# Patient Record
Sex: MALE
[De-identification: names, ages, dates, MRNs, and addresses within clinical notes are randomized per-mention and may not be internally consistent; named-entity substitution may affect disease eponyms.]

---

## 2017-10-28 NOTE — EDM.PDOC
ED HPI GENERAL MEDICAL PROBLEM





- General


Chief Complaint: Chest Pain


Stated Complaint: CHEST AND ARM PAIN


Time Seen by Provider: 10/28/17 13:55





- History of Present Illness


INITIAL COMMENTS - FREE TEXT/NARRATIVE: 





67-year-old male presents emergency room with chest pain.





This pain has been getting pretty bad over the last hour or so. However he's 

had significant pain for the last week that comes and goes. He cannot correlate 

of activity makes this worse he has had the pain come on with rest however it 

was to a lesser degree than the with the pain came on today. The patient smokes 

1-1/2-2 packs a day he's been told he's had high cholesterol in the past but 

these conscientious about his diet. Family history is negative for coronary 

artery disease his dad  of infection mom  of stroke and he had a sister 

 of what sounds like a berry aneurysm in her head. At times the pain seems 

burning but at times it is strongly pressure and this is when it radiates into 

both arms. The patient has had intermittent arm pain and hand pain for many 

years attributed to arthritis but this new pain for the last week is much 

different than and mostly affects the upper arm seems to be a pressure ache 

sensation.


  ** Chest


Pain Score (Numeric/FACES): 9





- Related Data


 Allergies











Allergy/AdvReac Type Severity Reaction Status Date / Time


 


No Known Allergies Allergy   Verified 10/28/17 14:08











Home Meds: 


 Home Meds





. [No Known Home Meds]  10/28/17 [History]











ED ROS GENERAL





- Review of Systems


Review Of Systems: See Below


Constitutional: Reports: No Symptoms


HEENT: Reports: No Symptoms


Respiratory: Reports: No Symptoms


Cardiovascular: Reports: Chest Pain.  Denies: Edema, Palpitations, Syncope


Endocrine: Denies: No Symptoms


GI/Abdominal: Reports: Other (He has some intermittent heartburn)


: Reports: No Symptoms


Musculoskeletal: Reports: Arm Pain, Other (He has some neck discomfort and 

upper back discomfort at times but this seems to have been going on for a while)


Skin: Reports: No Symptoms


Neurological: Reports: No Symptoms





ED EXAM, GENERAL





- Physical Exam


Exam: See Below


Exam Limited By: No Limitations


General Appearance: Alert, No Apparent Distress


Head: Atraumatic, Normocephalic


Neck: Normal Inspection, Supple, Non-Tender, Full Range of Motion


Respiratory/Chest: No Respiratory Distress, Lungs Clear, Normal Breath Sounds, 

Decreased Breath Sounds, Other (Slightly prolonged expiration).  No: Crackles, 

Rales, Rhonchi, Wheezing


Cardiovascular: Regular Rate, Rhythm, No Edema, No Murmur


GI/Abdominal: Normal Bowel Sounds, Soft, Non-Tender


Back Exam: Normal Inspection.  No: CVA Tenderness (L), CVA Tenderness (R)


Extremities: Normal Inspection, No Pedal Edema


Psychiatric: Normal Affect, Normal Mood





Course





- Vital Signs


Last Recorded V/S: 


 Last Vital Signs











Temp  36.1 C   10/28/17 13:53


 


Pulse  84   10/28/17 15:01


 


Resp  18   10/28/17 13:53


 


BP  154/88 H  10/28/17 15:01


 


Pulse Ox  98   10/28/17 13:53














- Orders/Labs/Meds


Orders: 


 Active Orders 24 hr











 Category Date Time Status


 


 EKG 12 Lead [EKG Documentation Completion] [RC] STAT Care  10/28/17 14:15 

Active


 


 EKG Documentation Completion [RC] STAT Care  10/28/17 14:10 Active


 


 Chest 1V Frontal [CR] Stat Exams  10/28/17 14:09 Taken


 


 Nitroglycerin [Nitrostat] Med  10/28/17 14:09 Active





 0.4 mg SL Q5M PRN   


 


 Sodium Chloride 0.9% [Normal Saline] 1,000 ml Med  10/28/17 15:15 Active





 IV ASDIRECTED   








 Medication Orders





Sodium Chloride (Normal Saline)  1,000 mls @ 50 mls/hr IV ASDIRECTED GENNA


   Stop: 17 15:08


   Last Admin: 10/28/17 15:24  Dose: 50 mls/hr


Nitroglycerin (Nitrostat)  0.4 mg SL Q5M PRN


   PRN Reason: Chest Pain


   Last Admin: 10/28/17 14:02  Dose: 0.4 mg








Labs: 


 Laboratory Tests











  10/28/17 10/28/17 10/28/17 Range/Units





  13:55 13:55 13:55 


 


WBC  11.47 H    (4.23-9.07)  K/mm3


 


RBC  5.06    (4.63-6.08)  M/mm3


 


Hgb  15.9    (13.7-17.5)  gm/L


 


Hct  46.9    (40.1-51.0)  %


 


MCV  92.7 H    (79.0-92.2)  fl


 


MCH  31.4    (25.7-32.2)  pg


 


MCHC  33.9    (32.2-35.5)  g/dl


 


RDW Std Deviation  44.1 H    (35.1-43.9)  fL


 


Plt Count  361 H    (163-337)  K/mm3


 


MPV  8.8 L    (9.4-12.3)  fl


 


Neutrophils % (Manual)  56    (40-60)  %


 


Band Neutrophils %  0    (0-10)  %


 


Lymphocytes % (Manual)  34    (20-40)  %


 


Atypical Lymphs %  0    %


 


Monocytes % (Manual)  6    (2-10)  %


 


Eosinophils % (Manual)  3    (0.8-7.0)  %


 


Basophils % (Manual)  1    (0.2-1.2)  


 


Platelet Estimate  Adequate    


 


RBC Morph Comment  Normal    


 


PT   10.1   (8.0-13.0)  SECONDS


 


INR   0.93   


 


APTT   27   (22-36)  SECONDS


 


Sodium    143  (136-145)  mEq/L


 


Potassium    4.0  (3.5-5.1)  mEq/L


 


Chloride    104  ()  mEq/L


 


Carbon Dioxide    29  (21-32)  mEq/L


 


Anion Gap    14.0  (5-15)  


 


BUN    21 H  (7-18)  mg/dL


 


Creatinine    1.3  (0.7-1.3)  mg/dL


 


Est Cr Clr Drug Dosing    46.62  mL/min


 


Estimated GFR (MDRD)    55  (>60)  mL/min


 


BUN/Creatinine Ratio    16.2  (14-18)  


 


Glucose    110  ()  mg/dL


 


Calcium    9.4  (8.5-10.1)  mg/dL


 


Total Bilirubin    0.4  (0.2-1.0)  mg/dL


 


AST    35  (15-37)  U/L


 


ALT    37  (16-63)  U/L


 


Alkaline Phosphatase    133 H  ()  U/L


 


Troponin I    0.130 H*  (0.00-0.056)  ng/mL


 


Total Protein    7.9  (6.4-8.2)  g/dl


 


Albumin    4.0  (3.4-5.0)  g/dl


 


Globulin    3.9  gm/dL


 


Albumin/Globulin Ratio    1.0  (1-2)  











Meds: 


Medications











Generic Name Dose Route Start Last Admin





  Trade Name Freq  PRN Reason Stop Dose Admin


 


Sodium Chloride  1,000 mls @ 50 mls/hr  10/28/17 15:15  10/28/17 15:24





  Normal Saline  IV  17 15:08  50 mls/hr





  ASDIRECTED GENNA   Administration


 


Nitroglycerin  0.4 mg  10/28/17 14:09  10/28/17 14:02





  Nitrostat  SL   0.4 mg





  Q5M PRN   Administration





  Chest Pain   














Discontinued Medications














Generic Name Dose Route Start Last Admin





  Trade Name Lu  PRN Reason Stop Dose Admin


 


Aspirin  Confirm  10/28/17 14:05  10/28/17 14:10





  Aspirin  Administered  10/28/17 14:06  Not Given





  Dose   





  324 mg   





  .ROUTE   





  .STK-MED ONE   


 


Aspirin  324 mg  10/28/17 14:09  10/28/17 14:01





  Aspirin  PO  10/28/17 14:10  324 mg





  ONETIME ONE   Administration


 


Enoxaparin Sodium  60 mg  10/28/17 14:55  10/28/17 14:59





  Lovenox  SUBCUT  10/28/17 14:56  60 mg





  ONETIME ONE   Administration


 


Sodium Chloride  Confirm  10/28/17 15:13  10/28/17 15:23





  Normal Saline  Administered  10/28/17 15:14  Not Given





  Dose   





  1,000 mls @ as directed   





  .ROUTE   





  .STK-MED ONE   


 


Metoprolol Tartrate  25 mg  10/28/17 14:40  10/28/17 15:01





  Lopressor  PO  10/28/17 14:41  25 mg





  ONETIME ONE   Administration


 


Nitroglycerin  Confirm  10/28/17 14:04  10/28/17 14:10





  Nitrostat  Administered  10/28/17 14:05  Not Given





  Dose   





  0.4 mg   





  .ROUTE   





  .STK-MED ONE   














- Re-Assessments/Exams


Free Text/Narrative Re-Assessment/Exam: 





10/28/17 14:25


At the time of my evaluation his pain is down to about a 3/10 he was given a 

single nitroglycerin and became pain-free he was also given 324 mg of chewed 

aspirin. Initial EKG was remarkable for ST depression in the lateral and septal 

leads posterior leaves were checked that did not show any elevation








10/28/17 15:04


troponin has bumped it still equivocal range at 0.13 however with his 

presentation and risk factors and EKG changes I discussed situation with Dr. Madrid cardiologist at [facility] in [place] who recommends transferring the 

patient the patient has already received 25 mg of Lopressor he has remained 

pain free we will get him 60 mg of Lovenox. Ground transport to [facility]'s case 

discussed with  who will admit the patient and consult cardiology.





Departure





- Departure


Time of Disposition: 14:45


Disposition: DC/Tfer to Acute Hospital 02


Reason for Transfer *Q: Other


Clinical Impression: 


 NSTEMI (non-ST elevated myocardial infarction)





Referrals: 


PCP,None [Primary Care Provider] - 


Forms:  ED Department Discharge





- My Orders


Last 24 Hours: 


My Active Orders





10/28/17 14:09


Chest 1V Frontal [CR] Stat 


Nitroglycerin [Nitrostat]   0.4 mg SL Q5M PRN 





10/28/17 14:10


EKG Documentation Completion [RC] STAT 





10/28/17 14:15


EKG 12 Lead [EKG Documentation Completion] [RC] STAT 





10/28/17 15:15


Sodium Chloride 0.9% [Normal Saline] 1,000 ml IV ASDIRECTED 














- Assessment/Plan


Last 24 Hours: 


My Active Orders





10/28/17 14:09


Chest 1V Frontal [CR] Stat 


Nitroglycerin [Nitrostat]   0.4 mg SL Q5M PRN 





10/28/17 14:10


EKG Documentation Completion [RC] STAT 





10/28/17 14:15


EKG 12 Lead [EKG Documentation Completion] [RC] STAT 





10/28/17 15:15


Sodium Chloride 0.9% [Normal Saline] 1,000 ml IV ASDIRECTED

## 2017-10-29 NOTE — CR
Chest: Portable view of the chest was obtained.

 

Comparison: No prior chest x-ray.

 

Heart size and mediastinum are within normal limits.  Mild scoliosis 

is noted within the spine.  Lungs are clear with no acute infiltrates.

  Equivocal nodule is noted within the right lung base.

 

Impression:

1.  Equivocal right lower lobe nodule.  Noncontrast chest CT 

recommended to hopefully exclude.

2.  Nothing acute is otherwise appreciated on portable chest x-ray.

 

Diagnostic code #9

## 2021-07-22 ENCOUNTER — HOSPITAL ENCOUNTER (EMERGENCY)
Dept: HOSPITAL 41 - JD.ED | Age: 71
Discharge: HOME | End: 2021-07-22
Payer: MEDICARE

## 2021-07-22 DIAGNOSIS — I25.2: ICD-10-CM

## 2021-07-22 DIAGNOSIS — Z79.899: ICD-10-CM

## 2021-07-22 DIAGNOSIS — R10.31: Primary | ICD-10-CM

## 2021-07-22 DIAGNOSIS — I10: ICD-10-CM

## 2021-07-22 DIAGNOSIS — Z95.5: ICD-10-CM

## 2021-07-22 DIAGNOSIS — M79.631: ICD-10-CM

## 2021-07-22 DIAGNOSIS — E78.00: ICD-10-CM

## 2021-07-22 PROCEDURE — 73090 X-RAY EXAM OF FOREARM: CPT

## 2021-07-22 PROCEDURE — 72192 CT PELVIS W/O DYE: CPT

## 2021-07-22 PROCEDURE — 80053 COMPREHEN METABOLIC PANEL: CPT

## 2021-07-22 PROCEDURE — 85652 RBC SED RATE AUTOMATED: CPT

## 2021-07-22 PROCEDURE — 96375 TX/PRO/DX INJ NEW DRUG ADDON: CPT

## 2021-07-22 PROCEDURE — 85379 FIBRIN DEGRADATION QUANT: CPT

## 2021-07-22 PROCEDURE — 36415 COLL VENOUS BLD VENIPUNCTURE: CPT

## 2021-07-22 PROCEDURE — 96374 THER/PROPH/DIAG INJ IV PUSH: CPT

## 2021-07-22 PROCEDURE — 99284 EMERGENCY DEPT VISIT MOD MDM: CPT

## 2021-07-22 PROCEDURE — 86140 C-REACTIVE PROTEIN: CPT

## 2021-07-22 PROCEDURE — 93971 EXTREMITY STUDY: CPT

## 2021-07-22 PROCEDURE — 85025 COMPLETE CBC W/AUTO DIFF WBC: CPT

## 2021-07-22 NOTE — US
Right lower extremity deep venous ultrasound: Duplex and color Doppler

 evaluation was obtained of the right common femoral, proximal greater

 saphenous, superficial femoral, popliteal, posterior tibial and 

peroneal veins.  Left common femoral vein was also evaluated.

 

Comparison: No prior venous imaging is available.

 

Findings: Normal phasic flow, augmentation and compression is seen.

 

Impression:

1.  No findings of deep venous thrombosis within the right lower 

extremity or within the left common femoral vein.

 

Diagnostic code #1

## 2021-07-22 NOTE — EDM.PDOC
ED HPI GENERAL MEDICAL PROBLEM





- General


Chief Complaint: Lower Extremity Injury/Pain


Stated Complaint: LEG AND GROIN PAIN


Time Seen by Provider: 07/22/21 17:01


Source of Information: Reports: Patient


History Limitations: Reports: No Limitations





- History of Present Illness


INITIAL COMMENTS - FREE TEXT/NARRATIVE: 





The patient presents with right groin pain.  He said this all started about a 

week ago with generalized muscle pain.  He then developed pain in the left groin

and now for 2 days the pain is in the right groin.  He also has pain in his left

forearm.  He said a few days ago he slipped in his truck and caught himself.  He

did not hit the floor.  He had some pain after that but not much.  He has no 

other injury.  He has no fever, chills, cough, chest pain, shortness of breath, 

abdominal pain, nausea, or vomiting.  He has no history of DVT or PE.


Onset: Gradual


Duration: Day(s):


Location: Reports: Upper Extremity, Left (forearm), Other (right groin)


Quality: Reports: Sharp


Severity: Moderate


Improves with: Reports: Immobilization


Worsens with: Reports: Movement


Context: Denies: Trauma


Associated Symptoms: Reports: No Other Symptoms


  ** Right Groin


Pain Score (Numeric/FACES): 10





- Related Data


                                    Allergies











Allergy/AdvReac Type Severity Reaction Status Date / Time


 


No Known Allergies Allergy   Verified 07/22/21 17:05











Home Meds: 


                                    Home Meds





Albuterol [Take Home: Albuterol 18 GM, 1 INH Pack] 1 inh INH Q4H PRN 07/22/21 

[History]


Hydrocodone/Acetaminophen [Hydrocodone-Acetamin 5-325 mg] 1 - 2 each PO Q6H PRN 

#15 tablet 07/22/21 [Rx]


Hydrocodone/Acetaminophen [Hydrocodone-Acetamin 5-325 mg] 1 - 2 each PO Q6H PRN 

#15 tablet 07/22/21 [Rx]


Metoprolol Tartrate 25 mg PO BID 07/22/21 [History]


atorvaSTATin [Lipitor] 40 mg PO BEDTIME 07/22/21 [History]


lisinopriL [Lisinopril] 20 mg PO DAILY 07/22/21 [History]


predniSONE [Prednisone] 40 mg PO DAILY #10 tablet 07/22/21 [Rx]


predniSONE [Prednisone] 40 mg PO DAILY #10 tablet 07/22/21 [Rx]











Past Medical History


Cardiovascular History: Reports: High Cholesterol, Hypertension, MI, Stents





- Past Surgical History


HEENT Surgical History: Reports: Adenoidectomy, Tonsillectomy


Cardiovascular Surgical History: Reports: Coronary Artery Stent





Social & Family History





- Tobacco Use


Tobacco Use Status *Q: Never Tobacco User





- Caffeine Use


Caffeine Use: Reports: Coffee





- Recreational Drug Use


Recreational Drug Use: No





Review of Systems





- Review of Systems


Review Of Systems: See Below


Constitutional: Reports: No Symptoms


Eyes: Reports: No Symptoms


Ears: Reports: No Symptoms


Nose: Reports: No Symptoms


Mouth/Throat: Reports: No Symptoms


Respiratory: Reports: No Symptoms


Cardiovascular: Reports: No Symptoms


GI/Abdominal: Reports: No Symptoms


Genitourinary: Reports: No Symptoms


Musculoskeletal: Reports: Other (Right groin pain and left foream pain)





ED EXAM, GENERAL





- Physical Exam


Exam: See Below


Exam Limited By: No Limitations


General Appearance: Alert, No Apparent Distress


Ears: Normal External Exam


Nose: Normal Inspection


Head: Atraumatic, Normocephalic


Neck: Normal Inspection


Respiratory/Chest: No Respiratory Distress, Lungs Clear, Normal Breath Sounds


Cardiovascular: Regular Rate, Rhythm, No Edema, No Murmur


GI/Abdominal: Soft, Non-Tender, No Organomegaly, No Mass


Rectal (Males) Exam: Normal Exam


Back Exam: Normal Inspection


Extremities: Other (Right forearm pain upon palpation.  No hernia palpated in 

the groin and mild pain upon palpation.  Pain is made worse by moving his right 

leg.  Good sensation and pulses distally.)





Course





- Vital Signs


Last Recorded V/S: 


                                Last Vital Signs











Temp  97.2 F   07/22/21 17:01


 


Pulse  89   07/22/21 17:01


 


Resp  20   07/22/21 17:01


 


BP  142/90 H  07/22/21 17:01


 


Pulse Ox  96   07/22/21 17:01














- Orders/Labs/Meds


Orders: 


                               Active Orders 24 hr











 Category Date Time Status


 


 Peripheral IV Care [RC] .AS DIRECTED Care  07/22/21 17:16 Active


 


 VL Duplex Lwr Ext Veins Ltd Rt [US] Stat Exams  07/22/21 18:29 Ordered


 


 Sodium Chloride 0.9% [Saline Flush] Med  07/22/21 17:16 Active





 10 ml FLUSH ASDIRECTED PRN   


 


 Peripheral IV Insertion Adult [OM.PC] Stat Oth  07/22/21 17:16 Ordered








                                Medication Orders





Sodium Chloride (Sodium Chloride 0.9% 10 Ml Syringe)  10 ml FLUSH ASDIRECTED PRN


   PRN Reason: Keep Vein Open


   Last Admin: 07/22/21 17:46  Dose: 10 ml


   Documented by: DENY








Labs: 


                                Laboratory Tests











  07/22/21 07/22/21 07/22/21 Range/Units





  17:38 17:38 17:38 


 


WBC  9.31 H    (4.23-9.07)  K/mm3


 


RBC  4.21 L    (4.63-6.08)  M/mm3


 


Hgb  12.8 L D    (13.7-17.5)  gm/dl


 


Hct  39.0 L    (40.1-51.0)  %


 


MCV  92.6 H    (79.0-92.2)  fl


 


MCH  30.4    (25.7-32.2)  pg


 


MCHC  32.8    (32.2-35.5)  g/dl


 


RDW Std Deviation  41.7    (35.1-43.9)  fL


 


Plt Count  322    (163-337)  K/mm3


 


MPV  8.9 L    (9.4-12.3)  fl


 


Neut % (Auto)  74.0 H    (34.0-67.9)  %


 


Lymph % (Auto)  15.1 L    (21.8-53.1)  %


 


Mono % (Auto)  8.9    (5.3-12.2)  %


 


Eos % (Auto)  1.4    (0.8-7.0)  


 


Baso % (Auto)  0.3    (0.1-1.2)  %


 


Neut # (Auto)  6.88 H    (1.78-5.38)  K/mm3


 


Lymph # (Auto)  1.41    (1.32-3.57)  K/mm3


 


Mono # (Auto)  0.83 H    (0.30-0.82)  K/mm3


 


Eos # (Auto)  0.13    (0.04-0.54)  K/mm3


 


Baso # (Auto)  0.03    (0.01-0.08)  K/mm3


 


ESR    34 H  (0-15)  mm/hr


 


D-Dimer, Quantitative     (0.19-0.50)  mg/L


 


Sodium   141   (136-145)  mEq/L


 


Potassium   4.4   (3.5-5.1)  mEq/L


 


Chloride   107   ()  mEq/L


 


Carbon Dioxide   26   (21-32)  mEq/L


 


Anion Gap   12.4   (5-15)  


 


BUN   22 H   (7-18)  mg/dL


 


Creatinine   1.4 H   (0.7-1.3)  mg/dL


 


Est Cr Clr Drug Dosing   47.50   mL/min


 


Estimated GFR (MDRD)   50   (>60)  mL/min


 


BUN/Creatinine Ratio   15.7   (14-18)  


 


Glucose   121 H   (70-99)  mg/dL


 


Calcium   8.8   (8.5-10.1)  mg/dL


 


Total Bilirubin   0.2   (0.2-1.0)  mg/dL


 


AST   26   (15-37)  U/L


 


ALT   50   (16-63)  U/L


 


Alkaline Phosphatase   129 H   ()  U/L


 


C-Reactive Protein   2.5 H*   (<1.0)  mg/dL


 


Total Protein   7.3   (6.4-8.2)  g/dl


 


Albumin   3.6   (3.4-5.0)  g/dl


 


Globulin   3.7   gm/dL


 


Albumin/Globulin Ratio   1.0   (1-2)  














  07/22/21 Range/Units





  17:38 


 


WBC   (4.23-9.07)  K/mm3


 


RBC   (4.63-6.08)  M/mm3


 


Hgb   (13.7-17.5)  gm/dl


 


Hct   (40.1-51.0)  %


 


MCV   (79.0-92.2)  fl


 


MCH   (25.7-32.2)  pg


 


MCHC   (32.2-35.5)  g/dl


 


RDW Std Deviation   (35.1-43.9)  fL


 


Plt Count   (163-337)  K/mm3


 


MPV   (9.4-12.3)  fl


 


Neut % (Auto)   (34.0-67.9)  %


 


Lymph % (Auto)   (21.8-53.1)  %


 


Mono % (Auto)   (5.3-12.2)  %


 


Eos % (Auto)   (0.8-7.0)  


 


Baso % (Auto)   (0.1-1.2)  %


 


Neut # (Auto)   (1.78-5.38)  K/mm3


 


Lymph # (Auto)   (1.32-3.57)  K/mm3


 


Mono # (Auto)   (0.30-0.82)  K/mm3


 


Eos # (Auto)   (0.04-0.54)  K/mm3


 


Baso # (Auto)   (0.01-0.08)  K/mm3


 


ESR   (0-15)  mm/hr


 


D-Dimer, Quantitative  1.09 H  (0.19-0.50)  mg/L


 


Sodium   (136-145)  mEq/L


 


Potassium   (3.5-5.1)  mEq/L


 


Chloride   ()  mEq/L


 


Carbon Dioxide   (21-32)  mEq/L


 


Anion Gap   (5-15)  


 


BUN   (7-18)  mg/dL


 


Creatinine   (0.7-1.3)  mg/dL


 


Est Cr Clr Drug Dosing   mL/min


 


Estimated GFR (MDRD)   (>60)  mL/min


 


BUN/Creatinine Ratio   (14-18)  


 


Glucose   (70-99)  mg/dL


 


Calcium   (8.5-10.1)  mg/dL


 


Total Bilirubin   (0.2-1.0)  mg/dL


 


AST   (15-37)  U/L


 


ALT   (16-63)  U/L


 


Alkaline Phosphatase   ()  U/L


 


C-Reactive Protein   (<1.0)  mg/dL


 


Total Protein   (6.4-8.2)  g/dl


 


Albumin   (3.4-5.0)  g/dl


 


Globulin   gm/dL


 


Albumin/Globulin Ratio   (1-2)  











Meds: 


Medications











Generic Name Dose Route Start Last Admin





  Trade Name Brittni  PRN Reason Stop Dose Admin


 


Sodium Chloride  10 ml  07/22/21 17:16  07/22/21 17:46





  Sodium Chloride 0.9% 10 Ml Syringe  FLUSH   10 ml





  ASDIRECTED PRN   Administration





  Keep Vein Open  














Discontinued Medications














Generic Name Dose Route Start Last Admin





  Trade Name Brittni  PRN Reason Stop Dose Admin


 


Hydromorphone HCl  0.5 mg  07/22/21 17:17  07/22/21 17:46





  Hydromorphone 0.5 Mg/0.5 Ml Syringe  IVPUSH  07/22/21 17:18  0.5 mg





  ONETIME ONE   Administration


 


Ketorolac Tromethamine  30 mg  07/22/21 18:25  07/22/21 18:57





  Ketorolac 30 Mg/Ml Sdv  IVPUSH  07/22/21 18:26  30 mg





  ONETIME ONE   Administration














- Re-Assessments/Exams


Free Text/Narrative Re-Assessment/Exam: 





07/22/21 18:40


I ordered an IV saline lock, dilaudid IV, toradol IV, labs and a CT of his 

pelvis.  The CT of his pelvis shows degenerative change.  Nothing acute is 

appreciated on CT study of the pelvis.  His WBC is elevated at 9.37.  His Hgb is

 elevated at 12.8.  His D-dimer is elevated at 1.09.  His creatinine is elevated

 at 1.4.  His glucose is elevated at 121.  His alk phos is elevated at 129.  His

 CRP is elevated at 2.5.  I have ordered an US of the right leg.


07/22/21 19:07


The US was negative for DVT.  I feel he may have pulled a muscle or has some art

hritis.  I will get him on some prednisone and some hydrocodone for pain.





Departure





- Departure


Time of Disposition: 19:10


Disposition: Home, Self-Care 01


Condition: Good


Clinical Impression: 


 Right groin pain








- Discharge Information


*PRESCRIPTION DRUG MONITORING PROGRAM REVIEWED*: Not Applicable


*COPY OF PRESCRIPTION DRUG MONITORING REPORT IN PATIENT PATRICIA: Not Applicable


Prescriptions: 


Hydrocodone/Acetaminophen [Hydrocodone-Acetamin 5-325 mg] 1 - 2 each PO Q6H PRN 

#15 tablet


 PRN Reason: Pain


Hydrocodone/Acetaminophen [Hydrocodone-Acetamin 5-325 mg] 1 - 2 each PO Q6H PRN 

#15 tablet


 PRN Reason: Pain


predniSONE [Prednisone] 40 mg PO DAILY #10 tablet


predniSONE [Prednisone] 40 mg PO DAILY #10 tablet


Referrals: 


Gordon Gregory PA-C [Primary Care Provider] - 1 Week


Forms:  ED Department Discharge


Additional Instructions: 


Take the prednisone daily for 5 days.  Take the hydrocodone as needed for pain. 

Follow up with Gordon within a week.  Please return if you are worse.





Sepsis Event Note (ED)





- Evaluation


Sepsis Screening Result: No Definite Risk





- Focused Exam


Vital Signs: 


                                   Vital Signs











  Temp Pulse Resp BP Pulse Ox


 


 07/22/21 17:01  97.2 F  89  20  142/90 H  96














- My Orders


Last 24 Hours: 


My Active Orders





07/22/21 17:16


Peripheral IV Care [RC] .AS DIRECTED 


Sodium Chloride 0.9% [Saline Flush]   10 ml FLUSH ASDIRECTED PRN 


Peripheral IV Insertion Adult [OM.PC] Stat 





07/22/21 18:29


VL Duplex Lwr Ext Veins Ltd Rt [US] Stat 














- Assessment/Plan


Last 24 Hours: 


My Active Orders





07/22/21 17:16


Peripheral IV Care [RC] .AS DIRECTED 


Sodium Chloride 0.9% [Saline Flush]   10 ml FLUSH ASDIRECTED PRN 


Peripheral IV Insertion Adult [OM.PC] Stat 





07/22/21 18:29


VL Duplex Lwr Ext Veins Ltd Rt [US] Stat

## 2021-07-22 NOTE — CT
CT pelvis

 

Technique: Multiple axial sections through the pelvis were obtained.  

Reconstructed coronal and sagittal images were also obtained.  

Intravenous and oral contrast were not utilized.

 

Comparison: Prior left hip radiographic study of 02/18/21.

 

Findings: Degenerative change is noted within the lower lumbar spine 

with mild spondylolisthesis noted at L4-5 due to degenerative 

apophyseal change.  There is vacuum disc phenomena seen within the 

L5-S1 disc with posterior disc space narrowing and lesser degenerative

 apophyseal change at this level.

 

There is fairly prominent degenerative joint space narrowing seen 

within both hips.  Degenerative change is also noted within the pubic 

symphysis with narrowing and sclerosis.

 

Moderate degenerative change is seen within both sacroiliac joints 

with vacuum phenomena.

 

Atherosclerotic calcification is seen within the iliac vessels and 

femoral vessels.

 

No discrete inguinal hernia is seen.  No inguinal adenopathy is seen. 

 No pelvic mass or adenopathy is seen.

 

Impression:

1.  Degenerative change as described above.

2.  Nothing acute is appreciated on CT study of the pelvis.

 

Diagnostic code #2

## 2021-07-22 NOTE — CR
Left forearm: 2 views of the left forearm were obtained.

 

Comparison: No prior forearm study is available.

 

Joint space narrowing is noted between the capitellum and radial head.

  Osteophytes are noted off the radial neck with degenerative cystic 

change seen within the capitellum.  

 

Spur is noted off the olecranon process at the attachment of the 

triceps tendon.

 

Mild joint space narrowing is noted between the distal radius and 

navicular bone.

 

No acute fracture or other acute osseous abnormality is seen

 

Impression:

1.  Degenerative change as noted above.

2.  Nothing acute is seen on 2 view left forearm study.

 

Diagnostic code #2

## 2021-12-17 ENCOUNTER — TRANSFERRED RECORDS (OUTPATIENT)
Dept: HEALTH INFORMATION MANAGEMENT | Facility: CLINIC | Age: 71
End: 2021-12-17
Payer: COMMERCIAL

## 2021-12-22 ENCOUNTER — TRANSFERRED RECORDS (OUTPATIENT)
Dept: HEALTH INFORMATION MANAGEMENT | Facility: CLINIC | Age: 71
End: 2021-12-22
Payer: COMMERCIAL

## 2022-01-05 ENCOUNTER — TRANSCRIBE ORDERS (OUTPATIENT)
Dept: OTHER | Age: 72
End: 2022-01-05
Payer: COMMERCIAL

## 2022-01-05 DIAGNOSIS — H91.92 ACUTE HEARING LOSS OF LEFT EAR: Primary | ICD-10-CM

## 2022-01-05 DIAGNOSIS — H91.91 HEARING LOSS IN RIGHT EAR: ICD-10-CM

## 2022-01-05 DIAGNOSIS — D33.3: ICD-10-CM

## 2022-01-10 ENCOUNTER — TELEPHONE (OUTPATIENT)
Dept: OTOLARYNGOLOGY | Facility: CLINIC | Age: 72
End: 2022-01-10
Payer: COMMERCIAL

## 2022-01-10 NOTE — TELEPHONE ENCOUNTER
Records Requested  01/10/22    Facility  [facility] records   Outcome No imaging or audios completed at [facility] per care everywhere, called patient and left him a message with my direct number for call back [phone] (ok to [facility]) - Amay

## 2022-01-11 ENCOUNTER — TELEPHONE (OUTPATIENT)
Dept: NEUROSURGERY | Facility: CLINIC | Age: 72
End: 2022-01-11
Payer: COMMERCIAL

## 2022-01-11 NOTE — TELEPHONE ENCOUNTER
Patient referred to Neurosurgery for:    Acute hearing loss of left ear [H91.92]   Hearing loss in right ear [H91.91]   Left-sided acoustic neuroma (H      No additional information provided. Clinic  attempting to retrieve records and also placed call to patient.     Writer called patient and left voicemail to discuss referral and records. Provided direct call back number.

## 2022-01-19 ENCOUNTER — TRANSFERRED RECORDS (OUTPATIENT)
Dept: HEALTH INFORMATION MANAGEMENT | Facility: CLINIC | Age: 72
End: 2022-01-19
Payer: COMMERCIAL

## 2022-01-19 NOTE — TELEPHONE ENCOUNTER
Called Dr. Chinedu Bruce's clinic at [facility], left message stating that we have made numerous attempts to reach patient. Asked them to call back if they have any additional contact information for patient, and/or records they can send.    Provided direct call back number.    Zoila Blood, RN  RN Care Coordinator, Skull Base Surgery  Direct: [phone]

## 2022-01-20 ENCOUNTER — TRANSFERRED RECORDS (OUTPATIENT)
Dept: HEALTH INFORMATION MANAGEMENT | Facility: CLINIC | Age: 72
End: 2022-01-20
Payer: COMMERCIAL

## 2022-01-21 NOTE — TELEPHONE ENCOUNTER
"Received voicemail from Ruthann, Dr. Chinedu Bruce's nurse. She contacted the patient. He and his family report they have been screening our calls because they are coming up as \"unknown.\" She encouraged them to answer these calls.    Ruthann provided phone numbers to reach patient:  1. Zach's direct number is [phone]. His son Loco may also answer.  2. Zach's wife, Trina: [phone].    Rutahnn's direct number is [phone].  "

## 2022-01-21 NOTE — TELEPHONE ENCOUNTER
Spoke with patient. He states he is overwhelmed right now, due to other health concerns and his wife is in the hospital with COVID as of yesterday. He states he has no money [place] right now, he has explored options through Medicaid already. We discussed that our team would work on gathering his records and be back in touch in a few days. He is in agreement with touching base next week.    He did not have a pen/paper to write down writer's contact information, but states he would have it in his phone. Encouraged him to call with any questions.    Provided update to Dr. Susana Beavers's nurse. She is working on faxing records and calling their medical records team to send us images. 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 She states he had recent imaging that showed a lung mass, which may be patient's first priority.

## 2022-01-21 NOTE — TELEPHONE ENCOUNTER
Records Requested  22    Facility  [facility]  Attn: Health Information Management  [address] [place] [address]  Fx. [phone]   Ph. [phone]    Outcome Referring clinic will be faxing over records and imaging reports, sent a fax for images:  Brain MRI 21     CT chest, abd, pelvis 22     *Trackin    2:57PM reports received for MRI and CT, no XR, images pending. Recs received, sent to scan and notified nurse - Amay   22, 21 primary care note   21 [facility] note and audiogram     22 3:48PM received disc for CT and MRI , sent to upload - Amay

## 2022-01-25 ENCOUNTER — HOSPITAL ENCOUNTER (EMERGENCY)
Dept: HOSPITAL 41 - JD.ED | Age: 72
Discharge: HOME | End: 2022-01-25
Payer: MEDICARE

## 2022-01-25 DIAGNOSIS — J44.9: ICD-10-CM

## 2022-01-25 DIAGNOSIS — I25.2: ICD-10-CM

## 2022-01-25 DIAGNOSIS — U07.1: Primary | ICD-10-CM

## 2022-01-25 DIAGNOSIS — Z95.5: ICD-10-CM

## 2022-01-25 DIAGNOSIS — I10: ICD-10-CM

## 2022-01-25 DIAGNOSIS — E78.00: ICD-10-CM

## 2022-01-25 DIAGNOSIS — Z72.0: ICD-10-CM

## 2022-01-25 PROCEDURE — 71045 X-RAY EXAM CHEST 1 VIEW: CPT

## 2022-01-25 PROCEDURE — 36415 COLL VENOUS BLD VENIPUNCTURE: CPT

## 2022-01-25 PROCEDURE — 99285 EMERGENCY DEPT VISIT HI MDM: CPT

## 2022-01-25 PROCEDURE — 71275 CT ANGIOGRAPHY CHEST: CPT

## 2022-01-25 PROCEDURE — 80053 COMPREHEN METABOLIC PANEL: CPT

## 2022-01-25 PROCEDURE — 82803 BLOOD GASES ANY COMBINATION: CPT

## 2022-01-25 PROCEDURE — 36600 WITHDRAWAL OF ARTERIAL BLOOD: CPT

## 2022-01-25 PROCEDURE — 84484 ASSAY OF TROPONIN QUANT: CPT

## 2022-01-25 PROCEDURE — 85379 FIBRIN DEGRADATION QUANT: CPT

## 2022-01-25 PROCEDURE — 85025 COMPLETE CBC W/AUTO DIFF WBC: CPT

## 2022-01-25 PROCEDURE — 86140 C-REACTIVE PROTEIN: CPT

## 2022-01-25 PROCEDURE — 93005 ELECTROCARDIOGRAM TRACING: CPT

## 2022-02-25 ENCOUNTER — TELEPHONE (OUTPATIENT)
Dept: NEUROSURGERY | Facility: CLINIC | Age: 72
End: 2022-02-25
Payer: COMMERCIAL

## 2022-02-25 NOTE — TELEPHONE ENCOUNTER
Spoke with patient's son regarding referral to [facility] for right sided acoustic neuroma. Patient's son states he is doing a lot better now and would be able to travel. They are interested in setting up an appointment.    Patient would like to coordinate same-day appointments if possible since they will be traveling from [facility]. They understand that if surgery is recommended, surgery would be coordinated on a separate date.

## 2022-03-09 NOTE — TELEPHONE ENCOUNTER
Left voicemail for patient to return call and discuss options for a consult with Dr. Luke and Dr. Gold. They reviewed his imaging and medical history, and would be okay with patient doing a virtual visit to save him a long trip (if patient can drive to [facility]).     If patient agreeable, may do an updated MRI prior to visit.    Zoila Blood RN  RN Care Coordinator, Skull Base Surgery  Direct: [phone]

## 2022-03-24 NOTE — TELEPHONE ENCOUNTER
Left another message for patient, offering potential virtual visit with providers if he would still like to schedule appt for his acoustic neuroma. Offered writer's direct callback number. Per chart review, it appears patient is being referred to radiation oncology for his lung cancer, due to being a poor surgical candidate. He is having an updated MRI brain on 3/31.

## 2022-08-04 ENCOUNTER — HOSPITAL ENCOUNTER (EMERGENCY)
Dept: HOSPITAL 41 - JD.ED | Age: 72
LOS: 1 days | Discharge: NURSING, REHAB OR LONG-TERM CARE | End: 2022-08-05
Payer: MEDICARE

## 2022-08-04 DIAGNOSIS — I10: ICD-10-CM

## 2022-08-04 DIAGNOSIS — Z20.822: ICD-10-CM

## 2022-08-04 DIAGNOSIS — Z79.899: ICD-10-CM

## 2022-08-04 DIAGNOSIS — Z87.891: ICD-10-CM

## 2022-08-04 DIAGNOSIS — J44.9: ICD-10-CM

## 2022-08-04 DIAGNOSIS — Z86.16: ICD-10-CM

## 2022-08-04 DIAGNOSIS — J85.0: Primary | ICD-10-CM

## 2022-08-04 DIAGNOSIS — E78.00: ICD-10-CM

## 2022-08-04 LAB
EGFRCR SERPLBLD CKD-EPI 2021: 91 ML/MIN (ref 60–?)
SARS-COV-2 RNA RESP QL NAA+PROBE: NEGATIVE

## 2022-08-04 PROCEDURE — 96365 THER/PROPH/DIAG IV INF INIT: CPT

## 2022-08-04 PROCEDURE — 85610 PROTHROMBIN TIME: CPT

## 2022-08-04 PROCEDURE — 93005 ELECTROCARDIOGRAM TRACING: CPT

## 2022-08-04 PROCEDURE — 36415 COLL VENOUS BLD VENIPUNCTURE: CPT

## 2022-08-04 PROCEDURE — 71045 X-RAY EXAM CHEST 1 VIEW: CPT

## 2022-08-04 PROCEDURE — 80053 COMPREHEN METABOLIC PANEL: CPT

## 2022-08-04 PROCEDURE — 96375 TX/PRO/DX INJ NEW DRUG ADDON: CPT

## 2022-08-04 PROCEDURE — 71260 CT THORAX DX C+: CPT

## 2022-08-04 PROCEDURE — 85025 COMPLETE CBC W/AUTO DIFF WBC: CPT

## 2022-08-04 PROCEDURE — 81001 URINALYSIS AUTO W/SCOPE: CPT

## 2022-08-04 PROCEDURE — 94640 AIRWAY INHALATION TREATMENT: CPT

## 2022-08-04 PROCEDURE — 96361 HYDRATE IV INFUSION ADD-ON: CPT

## 2022-08-04 PROCEDURE — 0240U: CPT

## 2022-08-04 PROCEDURE — 85730 THROMBOPLASTIN TIME PARTIAL: CPT

## 2022-08-04 PROCEDURE — 83605 ASSAY OF LACTIC ACID: CPT

## 2022-08-04 PROCEDURE — 96376 TX/PRO/DX INJ SAME DRUG ADON: CPT

## 2022-08-04 PROCEDURE — 83880 ASSAY OF NATRIURETIC PEPTIDE: CPT

## 2022-08-04 PROCEDURE — 96367 TX/PROPH/DG ADDL SEQ IV INF: CPT

## 2022-08-04 PROCEDURE — 99285 EMERGENCY DEPT VISIT HI MDM: CPT

## 2022-08-04 PROCEDURE — 84484 ASSAY OF TROPONIN QUANT: CPT

## 2022-08-04 PROCEDURE — 87040 BLOOD CULTURE FOR BACTERIA: CPT

## 2022-08-04 RX ADMIN — Medication ONE ML: at 21:58

## 2022-08-04 RX ADMIN — Medication ONE ML: at 21:34

## 2022-08-26 ENCOUNTER — HOSPITAL ENCOUNTER (EMERGENCY)
Dept: HOSPITAL 41 - JD.ED | Age: 72
LOS: 1 days | Discharge: HOME | End: 2022-08-27
Payer: MEDICARE

## 2022-08-26 DIAGNOSIS — C34.90: ICD-10-CM

## 2022-08-26 DIAGNOSIS — E86.0: ICD-10-CM

## 2022-08-26 DIAGNOSIS — L89.892: Primary | ICD-10-CM

## 2022-08-26 LAB — EGFRCR SERPLBLD CKD-EPI 2021: 99 ML/MIN (ref 60–?)

## 2022-08-26 PROCEDURE — 85007 BL SMEAR W/DIFF WBC COUNT: CPT

## 2022-08-26 PROCEDURE — 99284 EMERGENCY DEPT VISIT MOD MDM: CPT

## 2022-08-26 PROCEDURE — 71045 X-RAY EXAM CHEST 1 VIEW: CPT

## 2022-08-26 PROCEDURE — 94640 AIRWAY INHALATION TREATMENT: CPT

## 2022-08-26 PROCEDURE — 80053 COMPREHEN METABOLIC PANEL: CPT

## 2022-08-26 PROCEDURE — 96361 HYDRATE IV INFUSION ADD-ON: CPT

## 2022-08-26 PROCEDURE — 85027 COMPLETE CBC AUTOMATED: CPT

## 2022-08-26 PROCEDURE — 36415 COLL VENOUS BLD VENIPUNCTURE: CPT

## 2022-08-26 PROCEDURE — 83605 ASSAY OF LACTIC ACID: CPT

## 2022-08-26 PROCEDURE — 96360 HYDRATION IV INFUSION INIT: CPT
